# Patient Record
Sex: FEMALE
[De-identification: names, ages, dates, MRNs, and addresses within clinical notes are randomized per-mention and may not be internally consistent; named-entity substitution may affect disease eponyms.]

---

## 2022-07-14 PROBLEM — Z00.00 ENCOUNTER FOR PREVENTIVE HEALTH EXAMINATION: Status: ACTIVE | Noted: 2022-07-14

## 2022-10-13 ENCOUNTER — APPOINTMENT (OUTPATIENT)
Dept: GASTROENTEROLOGY | Facility: CLINIC | Age: 52
End: 2022-10-13

## 2024-07-25 ENCOUNTER — APPOINTMENT (OUTPATIENT)
Dept: ORTHOPEDIC SURGERY | Facility: CLINIC | Age: 54
End: 2024-07-25
Payer: COMMERCIAL

## 2024-07-25 VITALS — BODY MASS INDEX: 36.96 KG/M2 | WEIGHT: 230 LBS | HEIGHT: 66 IN

## 2024-07-25 DIAGNOSIS — M17.12 UNILATERAL PRIMARY OSTEOARTHRITIS, LEFT KNEE: ICD-10-CM

## 2024-07-25 DIAGNOSIS — M79.605 PAIN IN LEFT LEG: ICD-10-CM

## 2024-07-25 PROCEDURE — 73502 X-RAY EXAM HIP UNI 2-3 VIEWS: CPT | Mod: LT

## 2024-07-25 PROCEDURE — 99204 OFFICE O/P NEW MOD 45 MIN: CPT | Mod: 25

## 2024-07-25 PROCEDURE — 73564 X-RAY EXAM KNEE 4 OR MORE: CPT | Mod: LT

## 2024-07-25 PROCEDURE — 20610 DRAIN/INJ JOINT/BURSA W/O US: CPT | Mod: LT

## 2024-07-25 RX ORDER — MELOXICAM 15 MG/1
15 TABLET ORAL
Qty: 30 | Refills: 0 | Status: ACTIVE | COMMUNITY
Start: 2024-07-25 | End: 1900-01-01

## 2024-07-25 NOTE — REASON FOR VISIT
[Initial Visit] : an initial visit for [Other: ____] : [unfilled] [[employer] ] : provided by [employer]

## 2024-07-25 NOTE — DISCUSSION/SUMMARY
[Medication Risks Reviewed] : Medication risks reviewed [Surgical risks reviewed] : Surgical risks reviewed [de-identified] : Patient is a 53-year-old female with mild left knee osteoarthritis likely lumbar radiculopathy presenting today for initial evaluation.  She has not yet tried conservative treatment I think that is warranted at this time.  I gave her injection cortisone left knee which she tolerated well.  We discussed low impact activity and exercise.  I recommend meloxicam 15 mg daily as needed for pain.  I will see her back on an as-needed basis for her left knee.  All questions were asked and answered

## 2024-07-25 NOTE — PHYSICAL EXAM
[de-identified] :  GENERAL APPEARANCE: Well nourished and hydrated, pleasant, alert, and oriented x 3. Appears their stated age.     HEENT: Normocephalic, extraocular eye motion intact. Nasal septum midline. Oral cavity clear. External auditory canal clear.   RESPIRATORY: Breath sounds clear and audible in all lobes. No wheezing, No accessory muscle use.   CARDIOVASCULAR: No apparent abnormalities. No lower leg edema. No varicosities. Pedal pulses are palpable.   NEUROLOGIC: Sensation is normal, no muscle weakness in the upper or lower extremities.   DERMATOLOGIC: No apparent skin lesions, moist, warm, no rash.   SPINE: Cervical spine appears normal and moves freely; thoracic spine appears normal and moves freely; lumbosacral spine appears normal and moves freely, normal, nontender.   MUSCULOSKELETAL: Hands, wrists, and elbows are normal and move freely, shoulders are normal and move freely.   Psychiatric: Oriented to person, place, and time, insight and judgement were intact and the affect was normal. [de-identified] :  Left knee exam shows no effusion, ROM is 0-120 degrees, no instability, no pain with Dee, lateral joint line tenderness.   5/5 motor strength in bilateral lower extremities. Sensory: Intact in bilateral lower extremities. DTRs: Biceps, brachioradialis, triceps, patellar, ankle and plantar 2+ and symmetric bilaterally. Pulses: dorsalis pedis, posterior tibial, femoral, popliteal, and radial 2+ and symmetric bilaterally.   Left hip exam showed no groin pain with SLR, ROM is full flexion with 50 degrees of external rotation and 30 degrees of internal rotation without pain, MIKAELA negative, FADIR negative.  negative palpation over the greater trochanter   5/5 motor strength in bilateral lower extremities. Sensory: Intact in bilateral lower extremities. DTRs: Biceps, brachioradialis, triceps, patellar, ankle and plantar 2+ and symmetric bilaterally. Pulses: dorsalis pedis, posterior tibial, femoral, popliteal, and radial 2+ and symmetric bilaterally. [de-identified] : AP pelvis and 2 views of the left hip obtained the office today show no acute fracture or dislocation.  No significant degenerative changes noted.  4 views left knee obtained the office today show no acute fracture or dislocation there is mild medial joint space narrowing patellofemoral thrice consistent with Kellgren-Filiberto grade 1 changes.

## 2024-07-25 NOTE — HISTORY OF PRESENT ILLNESS
[de-identified] : SHAKILA BRASWELL is a 53 year old female presenting to the office for an initial evaluation of left hip and pain. She reports that the pain began 3 months ago. The patient cannot attribute their pain to any injury, fall, or trauma. The patient states that the pain began in her hip and radiates down her leg, including her knee. Pain is exacerbated when she sits and stands up from a seating position. She states that she uses Aleve and a muscle relaxer, which was prescribed from her PCP. She states that Aleve provides some relief, but she states that she has been having stomach issues when using it. She uses the muscle relaxer to help her sleep. The patient also reports that she is allergic to ibuprofen. She is ambulating without any assistance. No other complaints at this time. No constitutional symptoms.

## 2024-07-25 NOTE — REVIEW OF SYSTEMS
[Negative] : Heme/Lymph [Arthralgia] : arthralgia [Joint Pain] : joint pain [FreeTextEntry9] : left hip and knee pain

## 2024-07-25 NOTE — PROCEDURE
[de-identified] : I injected the left knee.  I discussed at length with the patient the planned steroid and lidocaine injection. The risks, benefits, convalescence and alternatives were reviewed. The possible side effects discussed included but were not limited to: pain, swelling, heat, bleeding, and redness. Symptoms are generally mild but if they are extensive then contact the office. Giving pain relievers by mouth such as NSAIDs or Tylenol can generally treat the reactions to steroid and lidocaine. Rare cases of infection have been noted. Rash, hives and itching may occur post injection. If you have muscle pain or cramps, flushing and or swelling of the face, rapid heart beat, nausea, dizziness, fever, chills, headache, difficulty breathing, swelling in the arms or legs, or have a prickly feeling of your skin, contact a health care provider immediately. Following this discussion, the knee was prepped with Alcohol and under sterile conditions the 80 mg Depo-Medrol and 6 cc Lidocaine injection was performed with a 20 gauge needle through a superolateral injection site. The needle was introduced into the joint, aspiration was performed to ensure intra-articular placement and the medication was injected. Upon withdrawal of the needle the site was cleaned with alcohol and a band aid applied. The patient tolerated the injection well and there were no adverse effects. Post injection instructions included no strenuous activity for 24 hours, cryotherapy and if there are any adverse effects to contact the office.

## 2024-07-25 NOTE — ADDENDUM
[FreeTextEntry1] : I, Ingacio Tom, acted solely as a scribe for Dr. Mustapha Castellanos on this date 07/25/2024.   All medical record entries made by the Scribe were at my, Dr. Mustapha Castellanos, direction and personally dictated by me on 07/25/2024. I have reviewed the chart and agree that the record accurately reflects my personal performance of the history, physical exam, assessment and plan. I have also personally directed, reviewed, and agreed with the chart.